# Patient Record
Sex: FEMALE | URBAN - METROPOLITAN AREA
[De-identification: names, ages, dates, MRNs, and addresses within clinical notes are randomized per-mention and may not be internally consistent; named-entity substitution may affect disease eponyms.]

---

## 2018-10-31 ENCOUNTER — DOCUMENTATION ONLY (OUTPATIENT)
Dept: PULMONOLOGY | Age: 68
End: 2018-10-31

## 2018-10-31 NOTE — PROGRESS NOTES
PATIENT:       Miguel Badillo YOB: 1950 DATE:       10/31/2018 11:30 AM 
VISIT TYPE:       Office Visit Completed Orders (this encounter) Order Interpretation Result Next Lab Date Giving encouragement to exercise Dietary management education, guidance, and counseling Assessment/Plan # Detail Type Description 1. Assessment Advanced sleep phase syndrome (G47.22). Impression Patient's primary concern is waking up at 3 o'clock and going to sleep at 8 o'clock Discussed with the patient at length about possibility of advanced sleep phase syndrome with her age Bright light therapy on the beginning of night discussed with the patient along with the sleep hygiene Patient would try some of this and see whether that helps Plan of care discussed at length. 2. Assessment Sleep apnea, unspecified (G47.30). Impression Snoring, snore arousals, ESS of 8, Mallampati of 3 suggest possibility of sleep apnea In view of hypertension and excess fatigue will consider home sleep study and follow proper that. Patient Plan Obstructive Sleep Apnea (MARTHA). a.  The consequences of sleep apnea; cardiovascular disease (increase risk of stroke, heart disease, hypertension), cognitive difficulties (attention, memory, concentration difficulties), endocrine difficulties (insulin resistance and possible increase risk of diabetes) and vegetative symptoms (sleepiness, fatigue) were discussed with the patient. b.  The patient was warned that sleep apnea is associated with increase risk of motor vehicle accidents due to daytime sleepiness. c.  The pathophysiology of sleep apnea and different treatment modalities (CPAP, surgery, dental appliance, weight reduction, positional therapy, medications) were discussed. Plan Orders Further diagnostic evaluations ordered today include(s) Home Sleep Study to be performed. 3. Assessment Essential (primary) hypertension (I10). Impression Continue management per PCP Optimal treatment of MARTHA will help with better control. 4. Assessment Rheumatoid arthritis, involving unspecified site, unspecified rheumatoid factor presence (M06.9). Impression Currently on prednisone being tapered off Also gets Remicade infusion and methotrexate Denies any complaints of cough wheezing chest pain or shortness of breath Offered PFT and further evaluation in related to chronic use of methotrexate patient will continue close follow-up with rheumatologist and decide further after discussing with rheumatology if she requires any PFT or any further workup. 5. Assessment Body mass index (BMI) 40.0-44.9, adult (Z68.41). Plan Orders Today's instructions / counseling include(s) Dietary management education, guidance, and counseling. Giving encouragement to exercise Assessment Evaluation for sleep apnea Plan Home sleep study and follow up I reviewed patient's medication and adjusted the medication. All the questions related to patient's management plan discussed and answered to patient's satisfaction. I spent 45 minutes of my time, more than 50% of time spent on face-to-face evaluation, for diagnosis pathology and discussion of various treatment options patient understood the plan all questions were on start. Voice-recognition software may have been used to generate this report, which may have resulted in some phonetic-based errors in grammar and contents. Even though attempts were made to correct all the mistakes, some may have been missed, and remained in the body of the document. This 70year old female presents for HPI and Sleep Apnea (consult). History of Present Illness: 1. HPI  
10/31/2018: 
PCP Dr. Farhan Goodwin Consult requested for sleep apnea evaluation History of snoring, snore arousal and daytime fatigue Usually goes to bed around 8 weeks up at 3 o'clock in the morning 6 since menopause she has this kind of sleep cycle Usually wakes up twice at nighttime to 4 no apparent reasons Occasional morning headache No history of any heart attack stroke or COPD Denies any symptoms of parasomnia restless leg syndrome or narcolepsy Denies any significant daytime fatigue are not taking naps during the daytime. ESS is about 8 out of 24 History of hypertension, rheumatoid arthritis currently on Remicade and methotrexate Denies any cough wheezing chest pain or shortness of breath